# Patient Record
Sex: FEMALE | Race: WHITE
[De-identification: names, ages, dates, MRNs, and addresses within clinical notes are randomized per-mention and may not be internally consistent; named-entity substitution may affect disease eponyms.]

---

## 2020-03-24 ENCOUNTER — HOSPITAL ENCOUNTER (EMERGENCY)
Dept: HOSPITAL 63 - ER | Age: 25
Discharge: HOME | End: 2020-03-24
Payer: SELF-PAY

## 2020-03-24 VITALS — WEIGHT: 194.01 LBS | BODY MASS INDEX: 31.18 KG/M2 | HEIGHT: 66 IN

## 2020-03-24 VITALS — DIASTOLIC BLOOD PRESSURE: 68 MMHG | SYSTOLIC BLOOD PRESSURE: 118 MMHG
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
## 2020-03-24 DIAGNOSIS — F10.20: ICD-10-CM

## 2020-03-24 DIAGNOSIS — S90.32XA: Primary | ICD-10-CM

## 2020-03-24 DIAGNOSIS — Z88.0: ICD-10-CM
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
## 2020-03-24 PROCEDURE — 99283 EMERGENCY DEPT VISIT LOW MDM: CPT

## 2020-03-24 PROCEDURE — 73630 X-RAY EXAM OF FOOT: CPT

## 2020-03-24 NOTE — RAD
FOOT RIGHT 3V 

 

DATE: 3/24/2020 2:00 AM

 

INDICATION:  Pain after kicking heavy object  

 

COMPARISON:  6/30/2015.

 

FINDINGS:

 

Bones: There is no evidence of acute fracture or dislocation.

 

Joints: The joint spaces are normal.  

 

Miscellaneous: None.

 

IMPRESSION:  

 

No evidence of acute fracture.

 

Electronically signed by: Ronnie Giron MD (3/24/2020 2:10 AM) [id number] good balance

## 2020-03-24 NOTE — PHYS DOC
Past History


Past Medical History:  Alcoholism, Other


Past Surgical History:  Other


Smoking:  Non-smoker


Alcohol Use:  Occasionally


Drug Use:  None





Adult General


Chief Complaint


Chief Complaint:  ".. I accidently kicked  20 #cow bell wt... I had left it in 

the floor... this right foot is swollen and starting to turn purple.. I took 

some ibuprofen 400, then tylenol 500 .. and  then ibuprofen 400 befoe I drove 

in.. it still hurts to bear wt..."





HPI


HPI





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 

presents with above hx and complaints of  Rt.   foot injury.  Pt. accidently 

kicked a 20# cow bell wt. .  Has obvious edema and ecchymosis of distal Rt. 

foot.  Localization of area toes 3 and 4.  Negative upper foot squeeze, + distal

foot squeeze.   Pain with loading of toes 3 and 4.  Distal neurovascular intact.

  No upper leg tenderness.   Ankle stable.  Heel stable.  Patient is driving 

herself.  Did take ibuprofen and Tylenol before presentation to the emergency 

department.  History of previous right foot and ankle injury on 2015.   

Patient previously  followed with Dr. Ford, currently follows with Rosa Almanzar 

for care.  Does have contacts with ill patients. No history immunosuppression. 

No history of travel outside [facility]. Is up-to-date with vaccinations 

including flu vaccination.





Review of Systems


Review of Systems





Constitutional: Denies fever or chills []


Eyes: Denies change in visual acuity, redness, or eye pain []


HENT: Denies nasal congestion or sore throat []


Respiratory: Denies cough or shortness of breath []


Cardiovascular: No additional information not addressed in HPI []


GI: Denies abdominal pain, nausea, vomiting, bloody stools or diarrhea []


: Denies dysuria or hematuria []


Musculoskeletal: Complains of right foot injury


Integument: Denies rash or skin lesions []


Neurologic: Denies headache, focal weakness or sensory changes []


Endocrine: Denies polyuria or polydipsia []





All other systems were reviewed and found to be within normal limits, except as 

documented in this note.





Family History


Family History


Noncontributory presentation





Current Medications


Current Medications


See nursing for home meds





Allergies


Allergies





Allergies








Coded Allergies Type Severity Reaction Last Updated Verified


 


  Penicillins Allergy Intermediate HIVES 6/30/15 Yes











Physical Exam


Physical Exam





Constitutional: Well developed, well nourished, no acute distress, non-toxic 

appearance. []


HENT: Normocephalic, atraumatic, bilateral external ears normal, oropharynx 

moist, no oral exudates, nose normal. []


Eyes: PERRLA, EOMI, conjunctiva normal, no discharge. [] 


Neck: Normal range of motion, no tenderness, supple, no stridor. [] 


Cardiovascular:Heart rate regular rhythm, no murmur []


Lungs & Thorax:  Bilateral breath sounds equal at apex auscultation []


Abdomen: Bowel sounds normal, soft, no tenderness, no masses, no pulsatile 

masses. [] 


Skin: Warm, dry, no erythema, no rash. [] 


Back: No tenderness, no CVA tenderness. [] 


Extremities: No tenderness, no cyanosis, no clubbing, ROM intact, no edema. [] 

Except findings of right foot injury as per history of present illness


Neurologic: Alert and oriented X 3, normal motor function, normal sensory 

function, no focal deficits noted. []


Psychologic: Affect anxious, judgement normal, mood normal. []





EKG


EKG


[]





Radiology/Procedures


Radiology/Procedures


[][facility] [address], [place] [address]


                                 [phone]


                                        


                                 IMAGING REPORT





                                     Signed





PATIENT: SONU TANG  ACCOUNT: [id number]     MRN#: [id number]


: 1995           LOCATION: ER              AGE: 24


SEX: F                    EXAM DT: 20         ACCESSION#: [id number]


STATUS: REG ER            ORD. PHYSICIAN: VICKY VALLADARES MD


REASON: pain, accident -kicked weight- ecchymosis and edema


PROCEDURE: FOOT RIGHT 3V





FOOT RIGHT 3V 


 


DATE: 3/24/2020 2:00 AM


 


INDICATION:  Pain after kicking heavy object  


 


COMPARISON:  2015.


 


FINDINGS:


 


Bones: There is no evidence of acute fracture or dislocation.


 


Joints: The joint spaces are normal.  


 


Miscellaneous: None.


 


IMPRESSION:  


 


No evidence of acute fracture.


 


Electronically signed by: Joan Giron MD (3/24/2020 2:10 AM) [id number]














DICTATED AND SIGNED BY:     JOAN GIRON MD


DATE:     20 0210





CC: VICKY VALLADARES MD; PCP,NO ~





Course & Med Decision Making


Course & Med Decision Making


Pertinent Labs and Imaging studies reviewed. (See chart for details)





Patient use Ace wrap. Elevate right foot and use ice packs. Continue Tylenol and

 ibuprofen.  Follow-up primary care. Return if any concerns.  For marked pain 

may take Vicoprofen up 4 times a day ( Narcotic Precautions -dependency and 

sedation).  Distal neurovascular intact after Ace placement. 





Impression:





1. Contusion Rt. Foot.





[]





Dragon Disclaimer


Dragon Disclaimer


This electronic medical record was generated, in whole or in part, using a voice

recognition dictation system.





Departure


Departure:


Disposition:   HOME/RESIDENCE PRIOR TO ADM


Condition:  STABLE


Referrals:  


PCP,SKYE (PCP)


Scripts


Hydrocodone/Ibuprofen (HYDROCODONE-IBUPROFEN 7.5-200  **) 1 Each Tablet


1 TAB PO PRN Q6HRS PRN for PAIN, #30 TAB 0 Refills


   Prov: VICKY VALLADARES MD         3/24/20





Dragon Disclaimer


This chart was dictated in whole or in part using Voice Recognition software in 

a busy, high-work load, and often noisy Emergency Department environment.  It 

may contain unintended and wholly unrecognized errors or omissions.











VICKY VALLADARES MD           Mar 24, 2020 01:40

## 2021-05-06 ENCOUNTER — HOSPITAL ENCOUNTER (EMERGENCY)
Dept: HOSPITAL 61 - ER | Age: 26
Discharge: HOME | End: 2021-05-06
Payer: COMMERCIAL

## 2021-05-06 VITALS — BODY MASS INDEX: 25.95 KG/M2 | WEIGHT: 165.35 LBS | HEIGHT: 67 IN

## 2021-05-06 VITALS — SYSTOLIC BLOOD PRESSURE: 145 MMHG | DIASTOLIC BLOOD PRESSURE: 81 MMHG

## 2021-05-06 DIAGNOSIS — F17.200: ICD-10-CM

## 2021-05-06 DIAGNOSIS — R06.02: ICD-10-CM

## 2021-05-06 DIAGNOSIS — F32.9: ICD-10-CM

## 2021-05-06 DIAGNOSIS — Z98.890: ICD-10-CM

## 2021-05-06 DIAGNOSIS — R07.89: Primary | ICD-10-CM

## 2021-05-06 LAB
ALBUMIN SERPL-MCNC: 4.2 G/DL (ref 3.4–5)
ALBUMIN/GLOB SERPL: 2 {RATIO} (ref 1–1.7)
ALP SERPL-CCNC: 47 U/L (ref 46–116)
ALT SERPL-CCNC: 18 U/L (ref 14–59)
ANION GAP SERPL CALC-SCNC: 5 MMOL/L (ref 6–14)
AST SERPL-CCNC: 14 U/L (ref 15–37)
BASOPHILS # BLD AUTO: 0 X10^3/UL (ref 0–0.2)
BASOPHILS NFR BLD: 1 % (ref 0–3)
BILIRUB SERPL-MCNC: 0.7 MG/DL (ref 0.2–1)
BUN SERPL-MCNC: 12 MG/DL (ref 7–20)
BUN/CREAT SERPL: 15 (ref 6–20)
CALCIUM SERPL-MCNC: 8.8 MG/DL (ref 8.5–10.1)
CHLORIDE SERPL-SCNC: 106 MMOL/L (ref 98–107)
CO2 SERPL-SCNC: 30 MMOL/L (ref 21–32)
CREAT SERPL-MCNC: 0.8 MG/DL (ref 0.6–1)
EOSINOPHIL NFR BLD: 0 X10^3/UL (ref 0–0.7)
EOSINOPHIL NFR BLD: 1 % (ref 0–3)
ERYTHROCYTE [DISTWIDTH] IN BLOOD BY AUTOMATED COUNT: 13.4 % (ref 11.5–14.5)
GFR SERPLBLD BASED ON 1.73 SQ M-ARVRAT: 88.9 ML/MIN
GLUCOSE SERPL-MCNC: 91 MG/DL (ref 70–99)
HCT VFR BLD CALC: 40 % (ref 36–47)
HGB BLD-MCNC: 13.5 G/DL (ref 12–15.5)
LYMPHOCYTES # BLD: 1.2 X10^3/UL (ref 1–4.8)
LYMPHOCYTES NFR BLD AUTO: 33 % (ref 24–48)
MCH RBC QN AUTO: 31 PG (ref 25–35)
MCHC RBC AUTO-ENTMCNC: 34 G/DL (ref 31–37)
MCV RBC AUTO: 90 FL (ref 79–100)
MONO #: 0.4 X10^3/UL (ref 0–1.1)
MONOCYTES NFR BLD: 10 % (ref 0–9)
NEUT #: 2.2 X10^3/UL (ref 1.8–7.7)
NEUTROPHILS NFR BLD AUTO: 57 % (ref 31–73)
PLATELET # BLD AUTO: 109 X10^3/UL (ref 140–400)
POTASSIUM SERPL-SCNC: 4.3 MMOL/L (ref 3.5–5.1)
PROT SERPL-MCNC: 6.3 G/DL (ref 6.4–8.2)
RBC # BLD AUTO: 4.43 X10^6/UL (ref 3.5–5.4)
SODIUM SERPL-SCNC: 141 MMOL/L (ref 136–145)
WBC # BLD AUTO: 3.8 X10^3/UL (ref 4–11)

## 2021-05-06 PROCEDURE — 83880 ASSAY OF NATRIURETIC PEPTIDE: CPT

## 2021-05-06 PROCEDURE — 81025 URINE PREGNANCY TEST: CPT

## 2021-05-06 PROCEDURE — 71046 X-RAY EXAM CHEST 2 VIEWS: CPT

## 2021-05-06 PROCEDURE — 96374 THER/PROPH/DIAG INJ IV PUSH: CPT

## 2021-05-06 PROCEDURE — 85025 COMPLETE CBC W/AUTO DIFF WBC: CPT

## 2021-05-06 PROCEDURE — 84484 ASSAY OF TROPONIN QUANT: CPT

## 2021-05-06 PROCEDURE — 93005 ELECTROCARDIOGRAM TRACING: CPT

## 2021-05-06 PROCEDURE — 80053 COMPREHEN METABOLIC PANEL: CPT

## 2021-05-06 PROCEDURE — 36415 COLL VENOUS BLD VENIPUNCTURE: CPT

## 2021-05-06 PROCEDURE — 85379 FIBRIN DEGRADATION QUANT: CPT

## 2021-05-06 PROCEDURE — 99285 EMERGENCY DEPT VISIT HI MDM: CPT

## 2021-05-06 NOTE — ED.ADGEN
Past Medical History


Past Medical History:  Depression


Past Surgical History:  


Smoking Status:  Current Every Day Smoker


Alcohol Use:  None





General Adult


EDM:


Chief Complaint:  CHEST WALL PAIN





HPI:


HPI:


Patient is 25-year-old female presents to the emergency room with right-sided 

chest pain that is worse with deep breathing that radiates into her back.  

Patient states this started a couple of days ago.  She denies any shortness of 

breath.  She is not having URI symptoms, cough, shortness of breath.  She does 

have some chronic shortness of breath since she had Covid in September.  She is 

concerned that she might have a PE.  She denies any recent travel or leg 

swelling.  She denies any trauma or recent exertion.





Review of Systems:


Review of Systems:


Complete ROS is negative unless otherwise documented in HPI





Current Medications:





Current Medications








 Medications


  (Trade)  Dose


 Ordered  Sig/Atul  Start Time


 Stop Time Status Last Admin


Dose Admin


 


 Ketorolac


 Tromethamine


  (Toradol 30mg


 Vial)  30 mg  1X  ONCE  21 12:00


 21 12:01 DC 21 13:05


30 MG











Allergies:


Allergies:





Allergies








Coded Allergies Type Severity Reaction Last Updated Verified


 


  No Known Drug Allergies    21 No











Physical Exam:


PE:


General: Awake, alert, NAD. Well Nourished, well hydrated. Cooperative


HEENT: Atraumatic, EOMI, PERRL, airway patent, moist oral mucosa


Neck: Supple, trachea midline


Respiratory: CTA bilaterally, normal effort, no wheezing/crackles


CV: RRR, no murmur, cap refill <2


GI: Soft, nondistended, nontender, no masses


MSK: No obvious deformities


Skin: Warm, dry, intact


Neuro: A&O x3, speech NL, sensory and motor grossly intact, no focal deficits


Psych: Normal affect, normal mood, not suicidal or homicidal





Current Patient Data:


Labs:





                                Laboratory Tests








Test


 21


10:57 21


11:20


 


POC Urine HCG, Qualitative


 Hcg negative


(Negative) 





 


White Blood Count


 


 3.8 x10^3/uL


(4.0-11.0)  L


 


Red Blood Count


 


 4.43 x10^6/uL


(3.50-5.40)


 


Hemoglobin


 


 13.5 g/dL


(12.0-15.5)


 


Hematocrit


 


 40.0 %


(36.0-47.0)


 


Mean Corpuscular Volume


 


 90 fL ()





 


Mean Corpuscular Hemoglobin  31 pg (25-35)  


 


Mean Corpuscular Hemoglobin


Concent 


 34 g/dL


(31-37)


 


Red Cell Distribution Width


 


 13.4 %


(11.5-14.5)


 


Platelet Count


 


 109 x10^3/uL


(140-400)  L


 


Neutrophils (%) (Auto)  57 % (31-73)  


 


Lymphocytes (%) (Auto)  33 % (24-48)  


 


Monocytes (%) (Auto)  10 % (0-9)  H


 


Eosinophils (%) (Auto)  1 % (0-3)  


 


Basophils (%) (Auto)  1 % (0-3)  


 


Neutrophils # (Auto)


 


 2.2 x10^3/uL


(1.8-7.7)


 


Lymphocytes # (Auto)


 


 1.2 x10^3/uL


(1.0-4.8)


 


Monocytes # (Auto)


 


 0.4 x10^3/uL


(0.0-1.1)


 


Eosinophils # (Auto)


 


 0.0 x10^3/uL


(0.0-0.7)


 


Basophils # (Auto)


 


 0.0 x10^3/uL


(0.0-0.2)


 


D-Dimer (Capri)


 


 < 0.27


ug/mlFEU


 


Sodium Level


 


 141 mmol/L


(136-145)


 


Potassium Level


 


 4.3 mmol/L


(3.5-5.1)


 


Chloride Level


 


 106 mmol/L


()


 


Carbon Dioxide Level


 


 30 mmol/L


(21-32)


 


Anion Gap  5 (6-14)  L


 


Blood Urea Nitrogen


 


 12 mg/dL


(7-20)


 


Creatinine


 


 0.8 mg/dL


(0.6-1.0)


 


Estimated GFR


(Cockcroft-Gault) 


 88.9  





 


BUN/Creatinine Ratio  15 (6-20)  


 


Glucose Level


 


 91 mg/dL


(70-99)


 


Calcium Level


 


 8.8 mg/dL


(8.5-10.1)


 


Total Bilirubin


 


 0.7 mg/dL


(0.2-1.0)


 


Aspartate Amino Transferase


(AST) 


 14 U/L (15-37)


L


 


Alanine Aminotransferase (ALT)


 


 18 U/L (14-59)





 


Alkaline Phosphatase


 


 47 U/L


()


 


Troponin I Quantitative


 


 < 0.017 ng/mL


(0.000-0.055)


 


NT-Pro-B-Type Natriuretic


Peptide 


 34 pg/mL


(0-124)


 


Total Protein


 


 6.3 g/dL


(6.4-8.2)  L


 


Albumin


 


 4.2 g/dL


(3.4-5.0)


 


Albumin/Globulin Ratio


 


 2.0 (1.0-1.7)


H





                                Laboratory Tests


21 11:20








                                Laboratory Tests


21 11:20











Vital Signs:





                                   Vital Signs








  Date Time  Temp Pulse Resp B/P (MAP) Pulse Ox O2 Delivery O2 Flow Rate FiO2


 


21 12:57  63 20 145/81 (102) 99 Room Air  


 


21 10:36 98.8       





 98.8       











EKG:


EKG:


[]





Heart Score:


C/O Chest Pain:  Yes


HEART Score for Chest Pain:  








HEART Score for Chest Pain Response (Comments) Value


 


History Slighlty/Non-Suspicious 0


 


ECG Normal 0


 


Age < 45 0


 


Risk Factors No Risk Factors 0


 


Troponin < Normal Limit 0


 


Total  0








Risk Factors:


Risk Factors:  DM, Current or recent (<one month) smoker, HTN, HLP, family 

history of CAD, obesity.


Risk Scores:


Score 0 - 3:  2.5% MACE over next 6 weeks - Discharge Home


Score 4 - 6:  20.3% MACE over next 6 weeks - Admit for Clinical Observation


Score 7 - 10:  72.7% MACE over next 6 weeks - Early Invasive Strategies





Radiology/Procedures:


Radiology/Procedures:


[]





Course & Med Decision Making:


Course & Med Decision Making


Pertinent Labs and Imaging studies reviewed. (See chart for details)





Patient is 25-year-old female presents to the emergency room with right-sided 

chest pain.  Patient's pain does not sound cardiac in nature.  She does not have

 any risk factors for cardiac chest pain.  Patient is concerned about a possible

 pulmonary embolism given her history of Covid twice and being on birth control.

  D-dimer, CBC, BMP, chest x-ray, EKG were ordered.  Work-up is unremarkable.  

Patient's test results and vitals while in the ED were fully reviewed and 

discussed with the patient. Patient is stable and at this time does not need 

admission to the hospital. We have discussed strict return precautions and the 

importance of following up with their Primary Care Physician. Patient stated 

understanding and was given an opportunity to ask any questions. Patient is in 

agreement with plan.





Dragon Disclaimer:


Dragon Disclaimer:


This electronic medical record was generated, in whole or in part, using a voice

 recognition dictation system.





Departure


Departure


Impression:  


   Primary Impression:  


   Chest wall pain


Disposition:   HOME / SELF CARE / HOMELESS


Condition:  STABLE


Referrals:  


NAYELI GONSALES MD (PCP)


Patient Instructions:  Chest Wall Pain











TICO TANG MD             May 6, 2021 13:12

## 2021-05-06 NOTE — RAD
Site ID: [id number]



EXAMINATION: XR CHEST 2V.



HISTORY: 25 years  Female  Reason: chest pain / Spl. Instructions:  / History: . .



COMPARISON: None.



Findings: The lungs are clear. The heart size is normal. There is no effusion or pneumothorax.



The mediastinum and tello appear unremarkable.



Impression: Unremarkable study.



Electronically signed by: Laci Ann MD (5/6/2021 12:46 PM) [id number]